# Patient Record
(demographics unavailable — no encounter records)

---

## 2024-11-03 NOTE — HISTORY OF PRESENT ILLNESS
[Disease: _____________________] : Disease: [unfilled] [de-identified] : 82F, originally from [place], PMHx HTN, asthma, prediabetes, GERD, cataracts, osteoarthritis, s/p knee replacement, s/p 3  sections, referred for medical oncology consultation of left breast cancer.  CASE SYNOPSIS: - right mastectomy w/ flap reconstruction & left breast symmetrizing at age 47. No adjuvant chemotherapy, RT or endocrine therapy was indicated.  3/6/2024 mammogram- focal asymmetries & mass to upper central breast BI-RADS 0. Diagnostic left breast mammogram and left breast US was recommended.  3/22/2024 left mammogram/breast US:  -L 12:00 N6, suspicious 1.0 cm slightly irregular hypoechoic mass, likely corresponds to mammo asymmetry -> f/u L U/S biopsy - L 2:00 N3, 5 mm ovoid hypoechoic slightly irregular nodule -> f/u L U/S biopsy, BI-RADS 4  4/3/2024 U/S biopsies : - L 2:00 N3 (coil): ALH w/ a complex papillary sclerosing lesion - L 12:00 N6 (dumbbell): moderately differentiated IDC w/ neuroendocrine features & LCIS, ER+/SC+/HER2- by FISH, Ki-67: 23%  2024 breast MRI: - left 12:00, bx proven malignancy, mildly prominent left internal mammary LN measures up to 6 mm - left axillary 1.4 cm LN w/ cortical thickening -> second look left axilla U/S & possible biopsy recommended - left breast bx proven atypia to 2:00 - s/p right mastectomy, no evidence of malignancy BI-RADS 6.  2024 Axilla, left, ultrasound guided core biopsy: - Benign reactive lymph node.  2024 CT C/A/P- IMPRESSION: No evidence of metastatic disease in the chest, abdomen, and pelvis. Pancreatic tail cystic lesion, 1.2 cm, stable compared to 3/1/2024. Consider further evaluation with MRI/MRCP. Stable indeterminate right renal interpole lobulated hypoattenuating focus, 1.9 cm, measuring higher than simple fluid.  2024- left breast lumpectomy, [facility] Surgical Pathology Report - Auth (Verified)  Specimen(s) Submitted 1- Left axillary sentinel lymph node 2- Left breast mass 3- Left breast cancer 4- Left breast superior margin 5- Left breast medial margin 6- Left breast inferior margin 7- Left breast lateral margin 8- Left breast deep margin  Final Diagnosis 1. Axilla, left, sentinel lymph node: - Two lymph nodes negative for carcinoma (0/2)  2. Breast, left, excision: - Atypical ductal hyperplasia involving intraductal papilloma - Lobular carcinoma in situ, classic type - Calcifications present in papilloma and unremarkable breast tissue - Biopsy site change  3. Breast, left, lumpectomy: - Invasive moderately differentiated ductal carcinoma, with neuroendocrine features - [place] score 6/9 (3 + 2 + 1) - Invasive tumor measures 12 mm (four consecutive tissue slices) - Lobular carcinoma in situ, classic type - Margins: - Invasive carcinoma is present 1 mm from the inked unoriented tissue edge - For final margins see parts 4-8 - Lymphovascular permeation by tumor not seen - Biopsy site change - HER2 will be repeated and reported in an addendum - Please see Synoptic Summary  4. Breast, left superior margin, excision: - Benign breast tissue  5. Breast, left medial margin, excision: - Benign breast tissue   6. Breast, left inferior marign, excision: - Intraductal papilloma  7. Breast, left lateral margin, excision: - Benign breast tissue  8. Breast, left deep margin, excision: - Benign fibroadipose tissue  - 2024-completes XRT left breast 2600  cGy   2024- starts Tamoxifen 20 mg daily   [de-identified] : Infiltrating ductal carcinoma with neuroendocrine features [de-identified] : ER+/PA+/HER2- by FISH, Ki-67: 23% [FreeTextEntry1] : surgery followed by RT and systemic therapy [de-identified] : Since the last visit in July, there has been no changes in physical examination. Reports dyspnea with exercise and occasionally reduced appetite.  Currently recovering from a cold, had an episode of dizziness in AM.  She is otherwise stable, denies recent hospitalization, falls or fractures.  Compliant with tamoxifen started in July 2024, shortly after completing 2600 cGy XRT to left breast between 7/2- 7/9/2024.  Accompanied by her daughter, Sri.  No new symptoms; clinically stable.  Denies recent hospitalization.  Medication list unchanged.

## 2024-11-03 NOTE — HISTORY OF PRESENT ILLNESS
[Disease: _____________________] : Disease: [unfilled] [de-identified] : 82F, originally from [place], PMHx HTN, asthma, prediabetes, GERD, cataracts, osteoarthritis, s/p knee replacement, s/p 3  sections, referred for medical oncology consultation of left breast cancer.  CASE SYNOPSIS: - right mastectomy w/ flap reconstruction & left breast symmetrizing at age 47. No adjuvant chemotherapy, RT or endocrine therapy was indicated.  3/6/2024 mammogram- focal asymmetries & mass to upper central breast BI-RADS 0. Diagnostic left breast mammogram and left breast US was recommended.  3/22/2024 left mammogram/breast US:  -L 12:00 N6, suspicious 1.0 cm slightly irregular hypoechoic mass, likely corresponds to mammo asymmetry -> f/u L U/S biopsy - L 2:00 N3, 5 mm ovoid hypoechoic slightly irregular nodule -> f/u L U/S biopsy, BI-RADS 4  4/3/2024 U/S biopsies : - L 2:00 N3 (coil): ALH w/ a complex papillary sclerosing lesion - L 12:00 N6 (dumbbell): moderately differentiated IDC w/ neuroendocrine features & LCIS, ER+/CA+/HER2- by FISH, Ki-67: 23%  2024 breast MRI: - left 12:00, bx proven malignancy, mildly prominent left internal mammary LN measures up to 6 mm - left axillary 1.4 cm LN w/ cortical thickening -> second look left axilla U/S & possible biopsy recommended - left breast bx proven atypia to 2:00 - s/p right mastectomy, no evidence of malignancy BI-RADS 6.  2024 Axilla, left, ultrasound guided core biopsy: - Benign reactive lymph node.  2024 CT C/A/P- IMPRESSION: No evidence of metastatic disease in the chest, abdomen, and pelvis. Pancreatic tail cystic lesion, 1.2 cm, stable compared to 3/1/2024. Consider further evaluation with MRI/MRCP. Stable indeterminate right renal interpole lobulated hypoattenuating focus, 1.9 cm, measuring higher than simple fluid.  2024- left breast lumpectomy, [facility] Surgical Pathology Report - Auth (Verified)  Specimen(s) Submitted 1- Left axillary sentinel lymph node 2- Left breast mass 3- Left breast cancer 4- Left breast superior margin 5- Left breast medial margin 6- Left breast inferior margin 7- Left breast lateral margin 8- Left breast deep margin  Final Diagnosis 1. Axilla, left, sentinel lymph node: - Two lymph nodes negative for carcinoma (0/2)  2. Breast, left, excision: - Atypical ductal hyperplasia involving intraductal papilloma - Lobular carcinoma in situ, classic type - Calcifications present in papilloma and unremarkable breast tissue - Biopsy site change  3. Breast, left, lumpectomy: - Invasive moderately differentiated ductal carcinoma, with neuroendocrine features - [place] score 6/9 (3 + 2 + 1) - Invasive tumor measures 12 mm (four consecutive tissue slices) - Lobular carcinoma in situ, classic type - Margins: - Invasive carcinoma is present 1 mm from the inked unoriented tissue edge - For final margins see parts 4-8 - Lymphovascular permeation by tumor not seen - Biopsy site change - HER2 will be repeated and reported in an addendum - Please see Synoptic Summary  4. Breast, left superior margin, excision: - Benign breast tissue  5. Breast, left medial margin, excision: - Benign breast tissue   6. Breast, left inferior marign, excision: - Intraductal papilloma  7. Breast, left lateral margin, excision: - Benign breast tissue  8. Breast, left deep margin, excision: - Benign fibroadipose tissue  - 2024-completes XRT left breast 2600  cGy   2024- starts Tamoxifen 20 mg daily   [de-identified] : Infiltrating ductal carcinoma with neuroendocrine features [de-identified] : ER+/TN+/HER2- by FISH, Ki-67: 23% [FreeTextEntry1] : surgery followed by RT and systemic therapy [de-identified] : Since the last visit in July, there has been no changes in physical examination. Reports dyspnea with exercise and occasionally reduced appetite.  Currently recovering from a cold, had an episode of dizziness in AM.  She is otherwise stable, denies recent hospitalization, falls or fractures.  Compliant with tamoxifen started in July 2024, shortly after completing 2600 cGy XRT to left breast between 7/2- 7/9/2024.  Accompanied by her daughter, Sri.  No new symptoms; clinically stable.  Denies recent hospitalization.  Medication list unchanged.

## 2024-11-03 NOTE — ASSESSMENT
[FreeTextEntry1] : Ms. ESQUIVEL 's questions were answered to her satisfaction.  She  expressed her  understanding and willingness to comply with the above recommendations, and  will return to the office in 6 month.

## 2024-11-03 NOTE — PHYSICAL EXAM
[Restricted in physically strenuous activity but ambulatory and able to carry out work of a light or sedentary nature] : Status 1- Restricted in physically strenuous activity but ambulatory and able to carry out work of a light or sedentary nature, e.g., light house work, office work [Normal] : affect appropriate [de-identified] : s/p right mastectomy with flap reconstruction; no masses in the left breast.  [de-identified] :  Mobile 1.5 cm left axillary lymph node

## 2024-11-03 NOTE — PHYSICAL EXAM
[Restricted in physically strenuous activity but ambulatory and able to carry out work of a light or sedentary nature] : Status 1- Restricted in physically strenuous activity but ambulatory and able to carry out work of a light or sedentary nature, e.g., light house work, office work [Normal] : affect appropriate [de-identified] : s/p right mastectomy with flap reconstruction; no masses in the left breast.  [de-identified] :  Mobile 1.5 cm left axillary lymph node

## 2024-11-03 NOTE — PHYSICAL EXAM
[Restricted in physically strenuous activity but ambulatory and able to carry out work of a light or sedentary nature] : Status 1- Restricted in physically strenuous activity but ambulatory and able to carry out work of a light or sedentary nature, e.g., light house work, office work [Normal] : affect appropriate [de-identified] : s/p right mastectomy with flap reconstruction; no masses in the left breast.  [de-identified] :  Mobile 1.5 cm left axillary lymph node

## 2024-11-03 NOTE — HISTORY OF PRESENT ILLNESS
[Disease: _____________________] : Disease: [unfilled] [de-identified] : 82F, originally from [place], PMHx HTN, asthma, prediabetes, GERD, cataracts, osteoarthritis, s/p knee replacement, s/p 3  sections, referred for medical oncology consultation of left breast cancer.  CASE SYNOPSIS: - right mastectomy w/ flap reconstruction & left breast symmetrizing at age 47. No adjuvant chemotherapy, RT or endocrine therapy was indicated.  3/6/2024 mammogram- focal asymmetries & mass to upper central breast BI-RADS 0. Diagnostic left breast mammogram and left breast US was recommended.  3/22/2024 left mammogram/breast US:  -L 12:00 N6, suspicious 1.0 cm slightly irregular hypoechoic mass, likely corresponds to mammo asymmetry -> f/u L U/S biopsy - L 2:00 N3, 5 mm ovoid hypoechoic slightly irregular nodule -> f/u L U/S biopsy, BI-RADS 4  4/3/2024 U/S biopsies : - L 2:00 N3 (coil): ALH w/ a complex papillary sclerosing lesion - L 12:00 N6 (dumbbell): moderately differentiated IDC w/ neuroendocrine features & LCIS, ER+/MD+/HER2- by FISH, Ki-67: 23%  2024 breast MRI: - left 12:00, bx proven malignancy, mildly prominent left internal mammary LN measures up to 6 mm - left axillary 1.4 cm LN w/ cortical thickening -> second look left axilla U/S & possible biopsy recommended - left breast bx proven atypia to 2:00 - s/p right mastectomy, no evidence of malignancy BI-RADS 6.  2024 Axilla, left, ultrasound guided core biopsy: - Benign reactive lymph node.  2024 CT C/A/P- IMPRESSION: No evidence of metastatic disease in the chest, abdomen, and pelvis. Pancreatic tail cystic lesion, 1.2 cm, stable compared to 3/1/2024. Consider further evaluation with MRI/MRCP. Stable indeterminate right renal interpole lobulated hypoattenuating focus, 1.9 cm, measuring higher than simple fluid.  2024- left breast lumpectomy, [facility] Surgical Pathology Report - Auth (Verified)  Specimen(s) Submitted 1- Left axillary sentinel lymph node 2- Left breast mass 3- Left breast cancer 4- Left breast superior margin 5- Left breast medial margin 6- Left breast inferior margin 7- Left breast lateral margin 8- Left breast deep margin  Final Diagnosis 1. Axilla, left, sentinel lymph node: - Two lymph nodes negative for carcinoma (0/2)  2. Breast, left, excision: - Atypical ductal hyperplasia involving intraductal papilloma - Lobular carcinoma in situ, classic type - Calcifications present in papilloma and unremarkable breast tissue - Biopsy site change  3. Breast, left, lumpectomy: - Invasive moderately differentiated ductal carcinoma, with neuroendocrine features - [place] score 6/9 (3 + 2 + 1) - Invasive tumor measures 12 mm (four consecutive tissue slices) - Lobular carcinoma in situ, classic type - Margins: - Invasive carcinoma is present 1 mm from the inked unoriented tissue edge - For final margins see parts 4-8 - Lymphovascular permeation by tumor not seen - Biopsy site change - HER2 will be repeated and reported in an addendum - Please see Synoptic Summary  4. Breast, left superior margin, excision: - Benign breast tissue  5. Breast, left medial margin, excision: - Benign breast tissue   6. Breast, left inferior marign, excision: - Intraductal papilloma  7. Breast, left lateral margin, excision: - Benign breast tissue  8. Breast, left deep margin, excision: - Benign fibroadipose tissue  - 2024-completes XRT left breast 2600  cGy   2024- starts Tamoxifen 20 mg daily   [de-identified] : Infiltrating ductal carcinoma with neuroendocrine features [de-identified] : ER+/OH+/HER2- by FISH, Ki-67: 23% [FreeTextEntry1] : surgery followed by RT and systemic therapy [de-identified] : Since the last visit in July, there has been no changes in physical examination. Reports dyspnea with exercise and occasionally reduced appetite.  Currently recovering from a cold, had an episode of dizziness in AM.  She is otherwise stable, denies recent hospitalization, falls or fractures.  Compliant with tamoxifen started in July 2024, shortly after completing 2600 cGy XRT to left breast between 7/2- 7/9/2024.  Accompanied by her daughter, Sri.  No new symptoms; clinically stable.  Denies recent hospitalization.  Medication list unchanged.

## 2025-05-09 NOTE — PHYSICAL EXAM
[Restricted in physically strenuous activity but ambulatory and able to carry out work of a light or sedentary nature] : Status 1- Restricted in physically strenuous activity but ambulatory and able to carry out work of a light or sedentary nature, e.g., light house work, office work [Normal] : no peripheral adenopathy appreciated [de-identified] : s/p right mastectomy with flap reconstruction; no masses in the left breast.

## 2025-05-09 NOTE — HISTORY OF PRESENT ILLNESS
[Disease: _____________________] : Disease: [unfilled] [de-identified] : 83F, originally from [place], PMHx HTN, asthma, prediabetes, GERD, cataracts, osteoarthritis, s/p knee replacement, s/p 3  sections, referred for medical oncology consultation of left breast cancer.  CASE SYNOPSIS: - right mastectomy w/ flap reconstruction & left breast symmetrizing at age 47. No adjuvant chemotherapy, RT or endocrine therapy was indicated.  3/6/2024 mammogram- focal asymmetries & mass to upper central breast BI-RADS 0. Diagnostic left breast mammogram and left breast US was recommended.  3/22/2024 left mammogram/breast US:  -L 12:00 N6, suspicious 1.0 cm slightly irregular hypoechoic mass, likely corresponds to mammo asymmetry -> f/u L U/S biopsy - L 2:00 N3, 5 mm ovoid hypoechoic slightly irregular nodule -> f/u L U/S biopsy, BI-RADS 4  4/3/2024 U/S biopsies : - L 2:00 N3 (coil): ALH w/ a complex papillary sclerosing lesion - L 12:00 N6 (dumbbell): moderately differentiated IDC w/ neuroendocrine features & LCIS, ER+/IN+/HER2- by FISH, Ki-67: 23%  2024 breast MRI: - left 12:00, bx proven malignancy, mildly prominent left internal mammary LN measures up to 6 mm - left axillary 1.4 cm LN w/ cortical thickening -> second look left axilla U/S & possible biopsy recommended - left breast bx proven atypia to 2:00 - s/p right mastectomy, no evidence of malignancy BI-RADS 6.  2024 Axilla, left, ultrasound guided core biopsy: - Benign reactive lymph node.  2024 CT C/A/P- IMPRESSION: No evidence of metastatic disease in the chest, abdomen, and pelvis. Pancreatic tail cystic lesion, 1.2 cm, stable compared to 3/1/2024. Consider further evaluation with MRI/MRCP. Stable indeterminate right renal interpole lobulated hypoattenuating focus, 1.9 cm, measuring higher than simple fluid.  2024- left breast lumpectomy, [facility] Surgical Pathology Report - Auth (Verified)  Specimen(s) Submitted 1- Left axillary sentinel lymph node 2- Left breast mass 3- Left breast cancer 4- Left breast superior margin 5- Left breast medial margin 6- Left breast inferior margin 7- Left breast lateral margin 8- Left breast deep margin  Final Diagnosis 1. Axilla, left, sentinel lymph node: - Two lymph nodes negative for carcinoma (0/2)  2. Breast, left, excision: - Atypical ductal hyperplasia involving intraductal papilloma - Lobular carcinoma in situ, classic type - Calcifications present in papilloma and unremarkable breast tissue - Biopsy site change  3. Breast, left, lumpectomy: - Invasive moderately differentiated ductal carcinoma, with neuroendocrine features - [place] score 6/9 (3 + 2 + 1) - Invasive tumor measures 12 mm (four consecutive tissue slices) - Lobular carcinoma in situ, classic type - Margins: - Invasive carcinoma is present 1 mm from the inked unoriented tissue edge - For final margins see parts 4-8 - Lymphovascular permeation by tumor not seen - Biopsy site change - HER2 will be repeated and reported in an addendum - Please see Synoptic Summary  4. Breast, left superior margin, excision: - Benign breast tissue  5. Breast, left medial margin, excision: - Benign breast tissue   6. Breast, left inferior marign, excision: - Intraductal papilloma  7. Breast, left lateral margin, excision: - Benign breast tissue  8. Breast, left deep margin, excision: - Benign fibroadipose tissue  - 2024-completes XRT left breast 2600  cGy   2024- starts Tamoxifen 20 mg daily   [de-identified] : Infiltrating ductal carcinoma with neuroendocrine features [de-identified] : ER+/AR+/HER2- by FISH, Ki-67: 23% [FreeTextEntry1] : surgery followed by RT and systemic therapy [de-identified] : Reporting no changes in clinical status, no new symptoms, denies hospitalization.  Physical examination unchanged from previous visit.  Complains of intermittent dyspnea with exertion.  It has been almost 1 year on tamoxifen started in June 2024.  Patient seems to tolerate treatment with no significant side effects.  Medication list reviewed and updated.  Hematologic profile stable.  Denies other changes in medical or surgical history. Had mammogram/breast US in April 18, 2025 at [facility].